# Patient Record
(demographics unavailable — no encounter records)

---

## 2024-10-07 NOTE — PHYSICAL EXAM
[No Acute Distress] : no acute distress [Well Nourished] : well nourished [Well Developed] : well developed [Well-Appearing] : well-appearing [Normal Sclera/Conjunctiva] : normal sclera/conjunctiva [Supple] : supple [No Respiratory Distress] : no respiratory distress  [No Accessory Muscle Use] : no accessory muscle use [Clear to Auscultation] : lungs were clear to auscultation bilaterally [Normal Rate] : normal rate  [Regular Rhythm] : with a regular rhythm [Normal S1, S2] : normal S1 and S2 [No Murmur] : no murmur heard [No Edema] : there was no peripheral edema [No Extremity Clubbing/Cyanosis] : no extremity clubbing/cyanosis [Soft] : abdomen soft [Non Tender] : non-tender [Non-distended] : non-distended [Normal Bowel Sounds] : normal bowel sounds [No Rash] : no rash [No Skin Lesions] : no skin lesions [Speech Grossly Normal] : speech grossly normal [Memory Grossly Normal] : memory grossly normal [Normal Affect] : the affect was normal [Alert and Oriented x3] : oriented to person, place, and time [Normal Mood] : the mood was normal [Normal Insight/Judgement] : insight and judgment were intact

## 2024-10-07 NOTE — HEALTH RISK ASSESSMENT
[Other reason not done] : Other reason not done [Current] : Current [de-identified] : Pt. currently taking medication

## 2024-10-07 NOTE — END OF VISIT
[] : Resident [FreeTextEntry3] : [] heart rate  #school form- hep B titer, give tdap, uds, flu shot , get hiv test

## 2024-10-07 NOTE — HEALTH RISK ASSESSMENT
[Other reason not done] : Other reason not done [Current] : Current [de-identified] : Pt. currently taking medication

## 2024-10-07 NOTE — HEALTH RISK ASSESSMENT
[Other reason not done] : Other reason not done [Current] : Current [de-identified] : Pt. currently taking medication

## 2025-03-24 NOTE — END OF VISIT
[] : Resident [FreeTextEntry3] : Scheduled to get combined breast reduction and elective chin implantation. Only meds are Ozempic, Concerta. Was given Lexapro scrip but not taking it. Tobacco use uses 2 vape cartridges per week. She will try to cut down on nicotine prior to surgery. Hold Ozempic x1 dose, hold Concerta day of surgery.

## 2025-03-24 NOTE — ASSESSMENT
[Patient Optimized for Surgery] : Patient optimized for surgery [ECG] : ECG [FreeTextEntry4] : Ms. Velasco is a 29 y/o F with a PMHx of Sjogrens, polyarthritis and fibromyalgia, ADHD with LEEANNA (sees Psychiatrist Dr. Annie Hong) presenting for pre-operative clearance for a breast reduction and a chin implant for 4/15/2025

## 2025-03-24 NOTE — HISTORY OF PRESENT ILLNESS
[No Pertinent Cardiac History] : no history of aortic stenosis, atrial fibrillation, coronary artery disease, recent myocardial infarction, or implantable device/pacemaker [Asthma] : asthma [No Adverse Anesthesia Reaction] : no adverse anesthesia reaction in self or family member [(Patient denies any chest pain, claudication, dyspnea on exertion, orthopnea, palpitations or syncope)] : Patient denies any chest pain, claudication, dyspnea on exertion, orthopnea, palpitations or syncope [Excellent (>10 METs)] : Excellent (>10 METs) [Chronic Anticoagulation] : no chronic anticoagulation [Chronic Kidney Disease] : no chronic kidney disease [Diabetes] : no diabetes [FreeTextEntry1] : 4/15/2025 [FreeTextEntry2] : 04/15/2025 [FreeTextEntry3] : Dr. Miguel Torres [FreeTextEntry4] : Ms. Velasco is a 27 y/o F with a PMHx of Sjogrens, polyarthritis and fibromyalgia, ADHD with LEEANNA (sees Psychiatrist Dr. Annie Hong) presenting for pre-operative clearance for a breast reduction and a chin implant for 4/15/2025. Pt is in good health, has complex psychiatric disorder currently managed by Dr. Hong. Is currently only taking Methylphenidate for ADHD as well as Ozempic for hx of obesity, last dose was 2.5mg yesterday. Pt suffers from chronic pains including back pains exacerbated by breasts. Only allergy is to pineapple, no reactions to anesthesia. Pt has longstanding hx of nicotine use. Has been smoking since age 15, has had periods where she smoked 2 PPD but more recently vaping. Last nicotine use was yesterday.

## 2025-03-24 NOTE — PLAN
[FreeTextEntry1] : #Preoperative Examination  Revised Cardiac Risk Index for Pre-Operative Risk score of 0 points. Marialuisa Perioperative Risk for Myocardial Infarction or Cardiac Arrest (MARYAM) 0.0%. No known allergies to medications. METS>4.  EKG showing NSR, no ischemic changes, no evidence of septal infarct, normal intervals  - labwork as requested by Dr. Torres completed. Lab results: CBC with diff, CMP, HCG, Hep A/B/C, HIV, PT/PTT/INR, UA are WNL  - Pt low risk for low risk procedure  - To fax this form plus bloodwork to #: [phone] or [phone]   #ADHD #LEEANNA Pt with hx of ADHD on Methylphenidate. Sees Dr. Annie Hong.  Continue to followup with Dr. Hong   #Sjogrens Disease Follows Dr. Lockett and Dr. De La Torre. Currently off of Plaquenil and/or immunoagents.  Continue to followup with Dr. De La Torre   RTO in 3-6 months for annual physical examination Will workup pt for sleep apnea at later visit  Discussed with attending physician Dr. Manjeet Diaz

## 2025-03-24 NOTE — ASSESSMENT
[Patient Optimized for Surgery] : Patient optimized for surgery [ECG] : ECG [FreeTextEntry4] : Ms. Velasco is a 27 y/o F with a PMHx of Sjogrens, polyarthritis and fibromyalgia, ADHD with LEEANNA (sees Psychiatrist Dr. Annie Hong) presenting for pre-operative clearance for a breast reduction and a chin implant for 4/15/2025

## 2025-03-24 NOTE — PLAN
[FreeTextEntry1] : #Preoperative Examination  Revised Cardiac Risk Index for Pre-Operative Risk score of 0 points. Marialuisa Perioperative Risk for Myocardial Infarction or Cardiac Arrest (MARYAM) 0.0%. No known allergies to medications. METS>4.  EKG showing NSR, no ischemic changes, no evidence of septal infarct, normal intervals  - labwork as requested by Dr. Torres completed. Lab results: CBC with diff, CMP, HCG, Hep A/B/C, HIV, PT/PTT/INR, UA are WNL  - Pt low risk for low risk procedure  - To fax this form plus bloodwork to #: [phone] or [phone]   #ADHD #LEEANNA Pt with hx of ADHD on Methylphenidate. Sees Dr. Annie Hong.  Continue to followup with Dr. Hong   #Sjogrens Disease Follows Dr. Lockett and Dr. De La Torre. Currently off of Plaquenil and/or immunoagents.  Continue to followup with Dr. De La Torre   RTO in 3-6 months for annual physical examination Will workup pt for sleep apnea at later visit  Discussed with attending physician Dr. Manjeet Diaz

## 2025-03-24 NOTE — PLAN
[FreeTextEntry1] : #Preoperative Examination  Revised Cardiac Risk Index for Pre-Operative Risk score of 0 points. Marialuisa Perioperative Risk for Myocardial Infarction or Cardiac Arrest (MARYAM) 0.0%. No known allergies to medications. METS>4.  EKG showing NSR, no ischemic changes, no evidence of septal infarct, normal intervals  - labwork as requested by Dr. Torres completed. Lab results: CBC with diff, CMP, HCG, Hep A/B/C, HIV, PT/PTT/INR, UA are WNL  - Pt low risk for low risk procedure  - To fax this form plus bloodwork to #: [phone] or [phone]   #ADHD #LEEANNA Pt with hx of ADHD on Methylphenidate. Sees Dr. Annie Hong.  Continue to followup with Dr. Hong   #Sjogrens Disease Follows Dr. Lockett and Dr. De La Torre. Currently off of Plaquenil and/or immunoagents.  Continue to followup with Dr. De La Torre   RTO in 3-6 months for annual physical examination Will workup pt for sleep apnea at later visit  Discussed with attending physician Dr. Manjeet Diaz

## 2025-03-24 NOTE — HISTORY OF PRESENT ILLNESS
[No Pertinent Cardiac History] : no history of aortic stenosis, atrial fibrillation, coronary artery disease, recent myocardial infarction, or implantable device/pacemaker [Asthma] : asthma [No Adverse Anesthesia Reaction] : no adverse anesthesia reaction in self or family member [(Patient denies any chest pain, claudication, dyspnea on exertion, orthopnea, palpitations or syncope)] : Patient denies any chest pain, claudication, dyspnea on exertion, orthopnea, palpitations or syncope [Excellent (>10 METs)] : Excellent (>10 METs) [Chronic Anticoagulation] : no chronic anticoagulation [Chronic Kidney Disease] : no chronic kidney disease [Diabetes] : no diabetes [FreeTextEntry1] : 4/15/2025 [FreeTextEntry2] : 04/15/2025 [FreeTextEntry3] : Dr. Miguel Torres [FreeTextEntry4] : Ms. Velasco is a 29 y/o F with a PMHx of Sjogrens, polyarthritis and fibromyalgia, ADHD with LEEANNA (sees Psychiatrist Dr. Annie Hong) presenting for pre-operative clearance for a breast reduction and a chin implant for 4/15/2025. Pt is in good health, has complex psychiatric disorder currently managed by Dr. Hong. Is currently only taking Methylphenidate for ADHD as well as Ozempic for hx of obesity, last dose was 2.5mg yesterday. Pt suffers from chronic pains including back pains exacerbated by breasts. Only allergy is to pineapple, no reactions to anesthesia. Pt has longstanding hx of nicotine use. Has been smoking since age 15, has had periods where she smoked 2 PPD but more recently vaping. Last nicotine use was yesterday.

## 2025-05-09 NOTE — PHYSICAL EXAM
[Well Nourished] : well nourished [Well Developed] : well developed [Well-Appearing] : well-appearing [Normal Sclera/Conjunctiva] : normal sclera/conjunctiva [Normal Outer Ear/Nose] : the outer ears and nose were normal in appearance [Supple] : supple [Speech Grossly Normal] : speech grossly normal [Normal Affect] : the affect was normal [Alert and Oriented x3] : oriented to person, place, and time [Normal Insight/Judgement] : insight and judgment were intact

## 2025-05-09 NOTE — ASSESSMENT
[FreeTextEntry1] : 28 yo woman with PMHx of PCOS, Sjogren's, psoriasis, just recently had breast reduction surgery and has been on antibiotics post operatively.  She developed a yeast infection and wants fluconazole.    Assessment:  Vaginal candidiasis

## 2025-05-09 NOTE — PLAN
[FreeTextEntry1] : Plan:  Fluconazole 200 mg orally x 1.  If recurs or no improvement in 72 hours, can take second dose. Start florastor OTC 10 day pack to help with adverse symptoms of antibiotics. Any new or worsening will require an emergency department evaluation. Otherwise, follow up with your PCP.

## 2025-05-09 NOTE — HISTORY OF PRESENT ILLNESS
[Home] : at home, [unfilled] , at the time of the visit. [Other Location: e.g. Home (Enter Location, City,State)___] : at [unfilled] [Telehealth (audio & video)] : This visit was provided via telehealth using real-time 2-way audio visual technology. [Verbal consent obtained from patient] : the patient, [unfilled] [FreeTextEntry8] : 28 yo woman with PMHx of PCOS, Sjogren's, psoriasis, just recently had breast reduction surgery and has been on antibiotics post operatively.  She developed a yeast infection and wants fluconazole.

## 2025-06-25 NOTE — HISTORY OF PRESENT ILLNESS
[Home] : at home, [unfilled] , at the time of the visit. [Medical Office: ([facility])___] : at the medical office located in  [Telehealth (audio & video)] : This visit was provided via telehealth using real-time 2-way audio visual technology. [Patient preference] : patient preference. [Verbal consent obtained from patient] : the patient, [unfilled] [de-identified] : 27 y/o F with a PMHx of Sjogrens, polyarthritis and fibromyalgia, ADHD with LEEANNA (sees Psychiatrist NP - Annie Hong) presenting for diagnosis for autism.   # c/f autism: Follows with Annie Brothers NP. Recent dx of ADHD (on concerta XR will be switched to vyvance next month) improvement with medical management. CPTSD, OCD - zoloft unable to tolerate due to panic attacks. Has difficulty with verbalizing with others. Has tried being seen by psychiatric poor experience. Gets very overstimulated very easily. Has never had any work up for ASD. Has had difficulty in social situation with cluster A symptoms (3/3), cluster B symptoms (2/4), was in special education for behavioral concerns. She has several issues and concerns in her past with family/ social issues.    #BINTA w/u: STOPBANG score 3 intermediate, has been told she snores in. has woken up with choking and gasping sensations, several witnessed apnea events. frequently wakes up with morning headaches, and fatigue. currently unemployed.

## 2025-06-25 NOTE — HISTORY OF PRESENT ILLNESS
[Home] : at home, [unfilled] , at the time of the visit. [Medical Office: ([facility])___] : at the medical office located in  [Telehealth (audio & video)] : This visit was provided via telehealth using real-time 2-way audio visual technology. [Patient preference] : patient preference. [Verbal consent obtained from patient] : the patient, [unfilled] [de-identified] : 29 y/o F with a PMHx of Sjogrens, polyarthritis and fibromyalgia, ADHD with LEEANNA (sees Psychiatrist NP - Annie Hong) presenting for diagnosis for autism.   # c/f autism: Follows with Annie Brothers NP. Recent dx of ADHD (on concerta XR will be switched to vyvance next month) improvement with medical management. CPTSD, OCD - zoloft unable to tolerate due to panic attacks. Has difficulty with verbalizing with others. Has tried being seen by psychiatric poor experience. Gets very overstimulated very easily. Has never had any work up for ASD. Has had difficulty in social situation with cluster A symptoms (3/3), cluster B symptoms (2/4), was in special education for behavioral concerns. She has several issues and concerns in her past with family/ social issues.    #BINTA w/u: STOPBANG score 3 intermediate, has been told she snores in. has woken up with choking and gasping sensations, several witnessed apnea events. frequently wakes up with morning headaches, and fatigue. currently unemployed.

## 2025-06-25 NOTE — REVIEW OF SYSTEMS
[Fatigue] : fatigue [Anxiety] : anxiety [Negative] : Neurological [Fever] : no fever [Chills] : no chills [Hot Flashes] : no hot flashes [Recent Change In Weight] : ~T no recent weight change [Discharge] : no discharge [Pain] : no pain [Redness] : no redness [Earache] : no earache [Hearing Loss] : no hearing loss [Nosebleed] : no nosebleeds [Hoarseness] : no hoarseness [Nasal Discharge] : no nasal discharge [Sore Throat] : no sore throat [Postnasal Drip] : no postnasal drip [Chest Pain] : no chest pain [Palpitations] : no palpitations [Leg Claudication] : no leg claudication [Lower Ext Edema] : no lower extremity edema [Shortness Of Breath] : no shortness of breath [Cough] : no cough [Dyspnea on Exertion] : no dyspnea on exertion [Abdominal Pain] : no abdominal pain [Nausea] : no nausea [Constipation] : no constipation [Diarrhea] : diarrhea [Heartburn] : no heartburn [Itching] : no itching [Skin Rash] : no skin rash [Headache] : no headache [Dizziness] : no dizziness [Suicidal] : not suicidal [Insomnia] : no insomnia

## 2025-06-25 NOTE — END OF VISIT
[] : Resident [FreeTextEntry3] : #anxiety, adhd- she thinks she may have autism. see neuropsych to evaluate  #snoring- get home sleep study